# Patient Record
Sex: FEMALE | Race: WHITE | Employment: OTHER | URBAN - METROPOLITAN AREA
[De-identification: names, ages, dates, MRNs, and addresses within clinical notes are randomized per-mention and may not be internally consistent; named-entity substitution may affect disease eponyms.]

---

## 2017-07-24 PROBLEM — I51.7 BILATERAL ENLARGEMENT OF ATRIA: Status: ACTIVE | Noted: 2017-07-24

## 2017-07-24 PROBLEM — I34.0 NON-RHEUMATIC MITRAL REGURGITATION: Status: ACTIVE | Noted: 2017-07-24

## 2017-07-24 PROBLEM — R93.1 ABNORMAL ECHOCARDIOGRAM: Status: ACTIVE | Noted: 2017-07-24

## 2017-07-24 PROBLEM — I36.1 NON-RHEUMATIC TRICUSPID VALVE INSUFFICIENCY: Status: ACTIVE | Noted: 2017-07-24

## 2019-04-09 PROBLEM — R93.1 ABNORMAL ECHOCARDIOGRAM: Status: RESOLVED | Noted: 2017-07-24 | Resolved: 2019-04-09

## 2019-04-09 PROBLEM — I50.22 CHRONIC SYSTOLIC CHF (CONGESTIVE HEART FAILURE) (HCC): Status: ACTIVE | Noted: 2019-04-09

## 2020-01-28 PROBLEM — R60.0 BILATERAL LEG EDEMA: Status: ACTIVE | Noted: 2020-01-28

## 2020-04-10 PROBLEM — I10 ESSENTIAL HYPERTENSION: Status: ACTIVE | Noted: 2020-04-10

## 2020-04-10 PROBLEM — Z79.899 LONG TERM CURRENT USE OF ANTIARRHYTHMIC DRUG: Status: ACTIVE | Noted: 2020-04-10

## 2020-04-10 PROBLEM — I95.1 ORTHOSTATIC HYPOTENSION: Status: ACTIVE | Noted: 2020-04-10

## 2020-09-17 ENCOUNTER — HOSPITAL ENCOUNTER (OUTPATIENT)
Dept: PREADMISSION TESTING | Age: 85
Setting detail: SPECIMEN
Discharge: HOME | End: 2020-09-21
Payer: MEDICARE

## 2020-09-17 PROCEDURE — U0003 INFECTIOUS AGENT DETECTION BY NUCLEIC ACID (DNA OR RNA); SEVERE ACUTE RESPIRATORY SYNDROME CORONAVIRUS 2 (SARS-COV-2) (CORONAVIRUS DISEASE [COVID-19]), AMPLIFIED PROBE TECHNIQUE, MAKING USE OF HIGH THROUGHPUT TECHNOLOGIES AS DESCRIBED BY CMS-2020-01-R: HCPCS

## 2020-09-18 LAB — SARS-COV-2, NAA: NOT DETECTED

## 2020-09-19 ENCOUNTER — TELEPHONE (OUTPATIENT)
Dept: PRIMARY CARE CLINIC | Age: 85
End: 2020-09-19

## 2020-09-21 ENCOUNTER — ANESTHESIA EVENT (OUTPATIENT)
Dept: OPERATING ROOM | Age: 85
End: 2020-09-21
Payer: MEDICARE

## 2020-09-21 ENCOUNTER — HOSPITAL ENCOUNTER (OUTPATIENT)
Age: 85
Setting detail: OUTPATIENT SURGERY
Discharge: HOME | End: 2020-09-21
Attending: INTERNAL MEDICINE | Admitting: INTERNAL MEDICINE
Payer: MEDICARE

## 2020-09-21 ENCOUNTER — ANESTHESIA (OUTPATIENT)
Dept: OPERATING ROOM | Age: 85
End: 2020-09-21
Payer: MEDICARE

## 2020-09-21 VITALS
SYSTOLIC BLOOD PRESSURE: 121 MMHG | RESPIRATION RATE: 18 BRPM | WEIGHT: 100 LBS | BODY MASS INDEX: 19.63 KG/M2 | OXYGEN SATURATION: 99 % | TEMPERATURE: 97.9 F | HEART RATE: 69 BPM | HEIGHT: 60 IN | DIASTOLIC BLOOD PRESSURE: 49 MMHG

## 2020-09-21 VITALS — DIASTOLIC BLOOD PRESSURE: 59 MMHG | OXYGEN SATURATION: 100 % | SYSTOLIC BLOOD PRESSURE: 130 MMHG

## 2020-09-21 PROCEDURE — 7100000010 HC PHASE II RECOVERY - FIRST 15 MIN: Performed by: INTERNAL MEDICINE

## 2020-09-21 PROCEDURE — 3609012400 HC EGD TRANSORAL BIOPSY SINGLE/MULTIPLE: Performed by: INTERNAL MEDICINE

## 2020-09-21 PROCEDURE — 2580000003 HC RX 258: Performed by: ANESTHESIOLOGY

## 2020-09-21 PROCEDURE — 3700000001 HC ADD 15 MINUTES (ANESTHESIA): Performed by: INTERNAL MEDICINE

## 2020-09-21 PROCEDURE — 7100000011 HC PHASE II RECOVERY - ADDTL 15 MIN: Performed by: INTERNAL MEDICINE

## 2020-09-21 PROCEDURE — 6360000002 HC RX W HCPCS: Performed by: NURSE ANESTHETIST, CERTIFIED REGISTERED

## 2020-09-21 PROCEDURE — 88305 TISSUE EXAM BY PATHOLOGIST: CPT

## 2020-09-21 PROCEDURE — 2500000003 HC RX 250 WO HCPCS: Performed by: NURSE ANESTHETIST, CERTIFIED REGISTERED

## 2020-09-21 PROCEDURE — 2709999900 HC NON-CHARGEABLE SUPPLY: Performed by: INTERNAL MEDICINE

## 2020-09-21 PROCEDURE — 3700000000 HC ANESTHESIA ATTENDED CARE: Performed by: INTERNAL MEDICINE

## 2020-09-21 RX ORDER — PROPOFOL 10 MG/ML
INJECTION, EMULSION INTRAVENOUS PRN
Status: DISCONTINUED | OUTPATIENT
Start: 2020-09-21 | End: 2020-09-21 | Stop reason: SDUPTHER

## 2020-09-21 RX ORDER — LIDOCAINE HYDROCHLORIDE 10 MG/ML
1 INJECTION, SOLUTION EPIDURAL; INFILTRATION; INTRACAUDAL; PERINEURAL
Status: DISCONTINUED | OUTPATIENT
Start: 2020-09-21 | End: 2020-09-21 | Stop reason: HOSPADM

## 2020-09-21 RX ORDER — SODIUM CHLORIDE, SODIUM LACTATE, POTASSIUM CHLORIDE, CALCIUM CHLORIDE 600; 310; 30; 20 MG/100ML; MG/100ML; MG/100ML; MG/100ML
INJECTION, SOLUTION INTRAVENOUS CONTINUOUS
Status: DISCONTINUED | OUTPATIENT
Start: 2020-09-21 | End: 2020-09-21 | Stop reason: HOSPADM

## 2020-09-21 RX ORDER — SODIUM CHLORIDE 0.9 % (FLUSH) 0.9 %
10 SYRINGE (ML) INJECTION EVERY 12 HOURS SCHEDULED
Status: DISCONTINUED | OUTPATIENT
Start: 2020-09-21 | End: 2020-09-21 | Stop reason: HOSPADM

## 2020-09-21 RX ORDER — LIDOCAINE HYDROCHLORIDE 20 MG/ML
INJECTION, SOLUTION EPIDURAL; INFILTRATION; INTRACAUDAL; PERINEURAL PRN
Status: DISCONTINUED | OUTPATIENT
Start: 2020-09-21 | End: 2020-09-21 | Stop reason: SDUPTHER

## 2020-09-21 RX ORDER — SODIUM CHLORIDE 0.9 % (FLUSH) 0.9 %
10 SYRINGE (ML) INJECTION PRN
Status: DISCONTINUED | OUTPATIENT
Start: 2020-09-21 | End: 2020-09-21 | Stop reason: HOSPADM

## 2020-09-21 RX ORDER — SODIUM CHLORIDE 9 MG/ML
INJECTION, SOLUTION INTRAVENOUS CONTINUOUS
Status: DISCONTINUED | OUTPATIENT
Start: 2020-09-21 | End: 2020-09-21

## 2020-09-21 RX ADMIN — PROPOFOL 10 MG: 10 INJECTION, EMULSION INTRAVENOUS at 12:23

## 2020-09-21 RX ADMIN — PROPOFOL 40 MG: 10 INJECTION, EMULSION INTRAVENOUS at 12:19

## 2020-09-21 RX ADMIN — SODIUM CHLORIDE, POTASSIUM CHLORIDE, SODIUM LACTATE AND CALCIUM CHLORIDE: 600; 310; 30; 20 INJECTION, SOLUTION INTRAVENOUS at 10:28

## 2020-09-21 RX ADMIN — LIDOCAINE HYDROCHLORIDE 50 MG: 20 INJECTION, SOLUTION EPIDURAL; INFILTRATION; INTRACAUDAL; PERINEURAL at 12:18

## 2020-09-21 RX ADMIN — PROPOFOL 10 MG: 10 INJECTION, EMULSION INTRAVENOUS at 12:20

## 2020-09-21 ASSESSMENT — PULMONARY FUNCTION TESTS
PIF_VALUE: 1

## 2020-09-21 ASSESSMENT — PAIN SCALES - GENERAL
PAINLEVEL_OUTOF10: 0

## 2020-09-21 ASSESSMENT — PAIN - FUNCTIONAL ASSESSMENT: PAIN_FUNCTIONAL_ASSESSMENT: 0-10

## 2020-09-21 NOTE — ANESTHESIA POSTPROCEDURE EVALUATION
Department of Anesthesiology  Postprocedure Note    Patient: Michael Brothers  MRN: [id number]  YOB: 1935  Date of evaluation: 9/21/2020  Time:  12:46 PM     Procedure Summary     Date:  09/21/20 Room / Location:  [place] [address] / [facility] - INPATIENT    Anesthesia Start:  1215 Anesthesia Stop:  1234    Procedure:  EGD BIOPSY Diagnosis:  (DX ABD PAIN)    Surgeon:  Tree Jenkins MD Responsible Provider:  Sonam Feliz DO    Anesthesia Type:  MAC, general ASA Status:  3          Anesthesia Type: No value filed. Javi Phase I:      Javi Phase II: Javi Score: 8    Last vitals: Reviewed and per EMR flowsheets.        Anesthesia Post Evaluation    Patient location during evaluation: PACU  Patient participation: complete - patient participated  Level of consciousness: awake and alert  Airway patency: patent  Nausea & Vomiting: no nausea and no vomiting  Complications: no  Cardiovascular status: hemodynamically stable  Respiratory status: acceptable  Hydration status: stable

## 2020-09-21 NOTE — OP NOTE
Operative Note      Patient: Frakn Saleh  YOB: 1935  MRN: [id number]    Date of Procedure: 9/21/2020    Pre-Op Diagnosis: DX ABD PAIN    Indication for the procedure patient is a pleasant 82years old female who was seen with abdominal pain in the epigastric area and nausea. She is scheduled for EGD for further evaluation. Post-Op Diagnosis: Medium size hiatal hernia, gastritis and irregular Z line      Informed consent: Risks, benefits, alternatives of procedure were explained to the patient prior to the procedure. Risks include but not limited to bleeding, perforation, aspiration, allergic reaction to medication. Patient is agreeable to proceed. Procedure(s):  EGD BIOPSY    Surgeon(s): Chetan Pond MD    Assistant:   * No surgical staff found *    Anesthesia: * No anesthesia type entered *    Estimated Blood Loss (mL): Minimal    Complications: None    Specimens:   ID Type Source Tests Collected by Time Destination   A : BIOPSY DUODENUM TEST FOR CELIAC  Tissue Duodenum SURGICAL PATHOLOGY Chetan Pond MD 9/21/2020 1221    B : GASTRIC BIOPSY FOR H PYLORIS STAIN Tissue Stomach SURGICAL PATHOLOGY Chetan Pond MD 9/21/2020 1223    C : BIOPSY ESOPHGUS FOR BARRETS Tissue Stomach SURGICAL PATHOLOGY Chetan Pond MD 9/21/2020 1224    D : BIOPSY PROXIMAL ESOPHGAS FOR EOSINOPHILIC ESOPAGITIS Tissue Stomach SURGICAL PATHOLOGY Chetan Pond MD 9/21/2020 1225        Implants:  * No implants in log *      Drains: * No LDAs found *    Findings: 1 medium size hiatal hernia  2.-  Patchy erythema of the stomach. Biopsies were taken to rule out H. pylori  3. Irregular Z line biopsies were taken to rule out Castorena's esophagus    Detailed Description of Procedure:   Patient was placed in the left lateral decubitus position. The well-lubricated Olympus EGD scope was passed was a bite block was advanced into the esophagus. Esophageal mucosa of the upper middle esophagus appeared normal.  Biopsies were taken. The scope was then advanced into the distal esophagus. Irregular Z line was seen. Biopsies were taken to rule out Castorena's esophagus. The scope was then advanced into the stomach. Patchy erythema of the stomach was noted. Biopsies were taken to rule out H. pylori. Retroflexion was done. Hiatal hernia was seen. The scope was then brought back into forward-viewing's and was advanced into the duodenum. Biopsies were taken to rule out celiac. The scope was then removed outside the patient. Patient tolerated procedure well.     Electronically signed by Hamilton Hoyos MD on 9/21/2020 at 7:15 PM

## 2020-09-21 NOTE — BRIEF OP NOTE
Brief Postoperative Note      Patient: Paulino Stevens  YOB: 1935  MRN: [id number]    Date of Procedure: 9/21/2020    Pre-Op Diagnosis: DX ABD PAIN    Post-Op Diagnosis: Medium size hiatal hernia, gastritis, lower esophageal ring, irregular Z line    Procedure(s):  EGD ESOPHAGOGASTRODUODENOSCOPY    Surgeon(s):   Rubén Gamez MD    Assistant:  * No surgical staff found *    Anesthesia: Monitor Anesthesia Care    Estimated Blood Loss (mL): Minimal    Complications: None    Specimens:   ID Type Source Tests Collected by Time Destination   A : BIOPSY DUODENUM TEST FOR CELIAC  Tissue Duodenum SURGICAL PATHOLOGY Rubén Gamez MD 9/21/2020 1221    B : GASTRIC BIOPSY FOR H PYLORIS STAIN Tissue Stomach SURGICAL PATHOLOGY Rubén Gamez MD 9/21/2020 1223    C : BIOPSY ESOPHGUS FOR BARRETS Tissue Stomach SURGICAL PATHOLOGY Rubén Gamez MD 9/21/2020 1224    D : BIOPSY PROXIMAL ESOPHGAS FOR EOSINOPHILIC ESOPAGITIS Tissue Stomach SURGICAL PATHOLOGY Rubén Gamez MD 9/21/2020 1225        Implants:  * No implants in log *      Drains: * No LDAs found *    Findings: As above    Electronically signed by Jackie Green MD on 9/21/2020 at 12:28 PM

## 2020-09-21 NOTE — H&P
History and Physical Service   [place] [address]    HISTORY AND PHYSICAL EXAMINATION            Date of Evaluation: 9/21/2020  Patient name:  Daria Conroy  MRN:   [id number]  YOB: 1935  PCP:    Siva Shahid MD    History Obtained From:     Patient, medical records     History of Present Illness: This is Daria Conroy a 82 y.o. female who presents today for a EGD by Dr. Maribel Mccallum for Abdominal Pain. Previous colonoscopy last december. Patient c/o severe pain around epigastric diaphragm area after eating, states has been over 6 months . Today denies bloody tarry stools, diarrhea alternating with constipation, fever, chills, night sweats, pain . Yes unexplained weight loss around 10 pounds. No history of ulcers, hiatal hernia . States has acid reflux/GERD, chronic constipation and  IBS. Has a defibrillator present due to history of atrial fibrillation. Denies any blood thinner use, denies diabetes history. Last ate and drank 9pm yesterday. Past Medical History:     Past Medical History:   Diagnosis Date    Atrial fibrillation ([address]     Cancer (HCC)     non hodgkins lymphoma    Cardiomyopathy ([address]     Cataract     CHF (congestive heart failure) (HCC)     COPD (chronic obstructive pulmonary disease) (HCC)     Dyslipidemia     Emphysema of lung (HCC)     Esophageal reflux     Fibromyalgia     Lupus ([address]     Macular degeneration     Osteoarthritis     Osteopenia     Osteopenia     Systemic lupus erythematosus ([address]     Trigeminal neuralgia         Past Surgical History:     Past Surgical History:   Procedure Laterality Date    CARDIAC DEFIBRILLATOR PLACEMENT  11/12, 4/3/2018    [employer]    CHOLECYSTECTOMY      COLONOSCOPY      EYE SURGERY      cataracts    [facility]    SHOULDER ARTHROPLASTY  2013        Medications Prior to Admission:     Prior to Admission medications    Medication Sig Start Date End Date Taking? Authorizing Provider   furosemide (LASIX) 40 MG tablet 2 tablets in qam and 1 tablet afternoon 7/2/20  Yes Luciano Rivera MD   propafenone (RYTHMOL) 150 MG tablet TAKE 1 TABLET THREE TIMES A DAY 5/29/20  Yes Sherita Marquez MD   mirtazapine (REMERON) 15 MG tablet Take 15 mg by mouth nightly   Yes Historical Provider, MD   Probiotic Product (PROBIOTIC DAILY PO) Take by mouth   Yes Historical Provider, MD   folic acid (FOLVITE) 1 MG tablet Take 1 mg by mouth daily. Yes Historical Provider, MD   doxepin (SINEQUAN) 50 MG capsule Take 50 mg by mouth nightly. Yes Historical Provider, MD   Lutein 6 MG CAPS Take by mouth    Historical Provider, MD   Calcium Carbonate (CALCIUM 600 PO) Take  by mouth daily. Historical Provider, MD   Omega-3 Fatty Acids (OMEGA 3 PO) Take  by mouth daily. Historical Provider, MD   Ascorbic Acid (VITAMIN C) 100 MG tablet Take 100 mg by mouth daily. Historical Provider, MD        Allergies:     Amiodarone; Apresoline [hydralazine]; Cephalosporins; Feldene [piroxicam]; Fosamax [alendronate]; Hydroxychloroquine; Infliximab; Levofloxacin; Loop diuretics; Niacin and related; Noroxin [norfloxacin]; Pcn [penicillins]; Penicillin v potassium; Piroxicam; Plaquenil [hydroxychloroquine sulfate]; Prilosec otc [omeprazole magnesium]; Procainamide; Procainamide hcl; Seasonal; Seldane [terfenadine]; Sulfa antibiotics; Tramadol; and Omeprazole    Social History:     Tobacco:    reports that she has never smoked. She has never used smokeless tobacco.  Alcohol:      reports no history of alcohol use. Drug Use:  reports no history of drug use. Family History:     Family History   Problem Relation Age of Onset    Heart Disease Father     Cancer Maternal Grandmother         uterine       Review of Systems:     Positive and Negative as described in HPI.     CONSTITUTIONAL:  negative for fevers, chills, sweats, fatigue, weight loss  HEENT:  negative for vision, hearing changes, runny nose, throat pain  RESPIRATORY:  negative for shortness of breath, cough, congestion, wheezing. CARDIOVASCULAR:  negative for chest pain, palpitations. GASTROINTESTINAL:  negative for nausea, vomiting, diarrhea, constipation, change in bowel habits, abdominal pain   GENITOURINARY:  negative for difficulty of urination, burning with urination, frequency   INTEGUMENT:  negative for rash, skin lesions, easy bruising   HEMATOLOGIC/LYMPHATIC:  negative for swelling/edema   ALLERGIC/IMMUNOLOGIC:  negative for urticaria , itching  ENDOCRINE:  negative increase in drinking, increase in urination, hot or cold intolerance  MUSCULOSKELETAL:  negative joint pains, muscle aches, swelling of joints  NEUROLOGICAL:  negative for headaches, dizziness, lightheadedness, numbness, pain, tingling extremities  BEHAVIOR/PSYCH:  negative for depression, anxiety    Physical Exam:   BP (!) 139/59   Pulse 73   Temp 95.7 °F (35.4 °C) (Temporal)   Resp 20   Ht 5' (1.524 m)   Wt 100 lb (45.4 kg)   SpO2 99%   BMI 19.53 kg/m²   No LMP recorded. Patient is postmenopausal.  No obstetric history on file. No results for input(s): POCGLU in the last 72 hours. General Appearance:  alert, well appearing, and in no acute distress  Mental status: oriented to person, place, and time with normal affect  Head:  normocephalic, atraumatic. Eye: no icterus, redness, pupils equal and reactive, extraocular eye movements intact, conjunctiva clear  Ear: normal external ear, no discharge, hearing intact  Nose:  no drainage noted  Mouth: mucous membranes moist  Neck: supple, no carotid bruits, thyroid not palpable  Lungs: Bilateral equal air entry, clear to ausculation, no wheezing, rales or rhonchi, normal effort  Cardiovascular: normal rate, regular rhythm, no murmur, gallop, rub.   Abdomen: Soft, nontender, nondistended, normal bowel sounds, no hepatomegaly or splenomegaly  Neurologic: There are no new focal motor or sensory deficits, normal muscle tone and bulk, no abnormal sensation, normal speech, cranial nerves II through XII grossly intact  Skin: No gross lesions, rashes, bruising or bleeding on exposed skin area  Extremities:  peripheral pulses palpable, no pedal edema or calf pain with palpation  Psych: normal affect     Investigations:      Laboratory Testing:  No results found for this or any previous visit (from the past 24 hour(s)). No results for input(s): HGB, HCT, WBC, MCV, PLATELET, NA, K, CL, CO2, BUN, CREATININE, GLUCOSE, INR, PROTIME, APTT, AST, ALT, LABALBU, HCG in the last 720 hours. Recent Labs     09/17/20  1030   COVID19 Not Detected     Imaging/Diagnostics:        Diagnosis:      1. Epigastric Abdominal Pain    Plans:     1. EGD       Refugio Osgood, APRN - CNP  9/21/2020  10:19 AM     Patient was seen and examined. Agree with above.  Will proceed with EGD

## 2020-09-21 NOTE — ANESTHESIA PRE PROCEDURE
Department of Anesthesiology  Preprocedure Note       Name:  Larisa Joseph   Age:  82 y.o.  :  1935                                          MRN:  [id number]         Date:  2020      Surgeon: Kelsey Chris): Jw Mendieta MD    Procedure: Procedure(s):  EGD BIOPSY    Medications prior to admission:   Prior to Admission medications    Medication Sig Start Date End Date Taking? Authorizing Provider   furosemide (LASIX) 40 MG tablet 2 tablets in qam and 1 tablet afternoon 20  Yes Spencer Craig MD   propafenone (RYTHMOL) 150 MG tablet TAKE 1 TABLET THREE TIMES A DAY 20  Yes Debbi Johnson MD   mirtazapine (REMERON) 15 MG tablet Take 15 mg by mouth nightly   Yes Historical Provider, MD   Probiotic Product (PROBIOTIC DAILY PO) Take by mouth   Yes Historical Provider, MD   folic acid (FOLVITE) 1 MG tablet Take 1 mg by mouth daily. Yes Historical Provider, MD   doxepin (SINEQUAN) 50 MG capsule Take 50 mg by mouth nightly. Yes Historical Provider, MD   Lutein 6 MG CAPS Take by mouth    Historical Provider, MD   Calcium Carbonate (CALCIUM 600 PO) Take  by mouth daily. Historical Provider, MD   Omega-3 Fatty Acids (OMEGA 3 PO) Take  by mouth daily. Historical Provider, MD   Ascorbic Acid (VITAMIN C) 100 MG tablet Take 100 mg by mouth daily. Historical Provider, MD       Current medications:    Current Facility-Administered Medications   Medication Dose Route Frequency Provider Last Rate Last Dose    lactated ringers infusion   Intravenous Continuous Evelyn Newton  mL/hr at 20 1028      sodium chloride flush 0.9 % injection 10 mL  10 mL Intravenous 2 times per day Evelyn Newton MD        sodium chloride flush 0.9 % injection 10 mL  10 mL Intravenous PRN Evelyn Newton MD        lidocaine PF 1 % injection 1 mL  1 mL Intradermal Once PRN Evelyn Newton MD           Allergies:     Allergies   Allergen Reactions    Amiodarone Other (See Comments)    Apresoline [Hydralazine]     Cephalosporins      Other reaction(s): Unknown  Tolerated cephalexin on 7/2/2014.  Feldene [Piroxicam]     Fosamax [Alendronate] Other (See Comments)     Jaw paino    Hydroxychloroquine     Infliximab     Levofloxacin     Loop Diuretics     Niacin And Related     Noroxin [Norfloxacin]      Other reaction(s): Unknown    Pcn [Penicillins]     Penicillin V Potassium Other (See Comments)     In childhood, experienced swelling. Tolerated cephalexin on 7/2/2014. Currently taking cephalexin without issue.      Piroxicam     Plaquenil [Hydroxychloroquine Sulfate]     Prilosec Otc [Omeprazole Magnesium]     Procainamide Other (See Comments)    Procainamide Hcl      PROCAINAMIDE HCL powder    Seasonal Hives     Other reaction(s): Unknown  LoopHzcal, Sifmapu    Seldane [Terfenadine]     Sulfa Antibiotics      Other reaction(s): Dizziness    Tramadol Other (See Comments)     Dizziness    Omeprazole Other (See Comments) and Nausea And Vomiting       Problem List:    Patient Active Problem List   Diagnosis Code    Nonischemic cardiomyopathy [address] I42.8    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    Fibrositis M79.7    Muscle ache M79.10    Osteomyelitis ([facility]) M86.9    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    Abnormal presence of protein in urine R80.9    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    Fibromyalgia M79.7    AICD (automatic cardioverter/defibrillator) present Z95.810    Paroxysmal atrial fibrillation (HCC) I48.0    Bilateral enlargement of atria I51.7    Non-rheumatic tricuspid valve insufficiency I36.1    Non-rheumatic mitral regurgitation I34.0    Chronic systolic congestive heart failure (HCC) I50.22    Bilateral leg edema R60.0    Long term current use of antiarrhythmic drug Z79.899    Orthostatic hypotension I95.1    Essential hypertension I10       Past Medical History:        Diagnosis Date    Atrial fibrillation (HCC)     Cancer (HCC)     non hodgkins lymphoma    [facility]     Cataract     CHF (congestive heart failure) (HCC)     COPD (chronic obstructive pulmonary disease) (HCC)     Dyslipidemia     Emphysema of lung (HCC)     Esophageal reflux     Fibromyalgia     Lupus ([facility]     Macular degeneration     Osteoarthritis     Osteopenia     Osteopenia     Systemic lupus erythematosus ([facility]     Trigeminal neuralgia        Past Surgical History:        Procedure Laterality Date    CARDIAC DEFIBRILLATOR PLACEMENT  11/12, 4/3/2018    [employer]    CHOLECYSTECTOMY      COLONOSCOPY      EYE SURGERY      cataracts    FIX[facility]  08/2020    [facility]    SHOULDER ARTHROPLASTY  2013       Social History:    Social History     Tobacco Use    Smoking status: Never Smoker    Smokeless tobacco: Never Used   Substance Use Topics    Alcohol use: No                                Counseling given: Not Answered      Vital Signs (Current):   Vitals:    09/21/20 1006 09/21/20 1011 09/21/20 1232   BP: (!) 139/59  (!) 112/42   Pulse: 73  69   Resp: 20  17   Temp: 95.7 °F (35.4 °C)  97.9 °F (36.6 °C)   TempSrc: Temporal  Temporal   SpO2: 99%  100%   Weight:  100 lb (45.4 kg)    Height:  5' (1.524 m)                                               BP Readings from Last 3 Encounters:   09/21/20 (!) 112/42   09/21/20 (!) 130/59   06/22/20 124/68       NPO Status: Time of last liquid consumption: 2100                        Time of last solid consumption: 2000                        Date of last liquid consumption: 09/20/20                        Date of last solid food consumption: 09/20/20    BMI:   Wt Readings from Last 3 Encounters:   09/21/20 100 lb (45.4 kg)   06/22/20 100 lb (45.4 kg)   06/22/20 100 lb (45.4 kg)     Body mass index is 19.53 kg/m².     CBC: No results found for: WBC, RBC, HGB, HCT, MCV, RDW, PLT    CMP:   Lab Results   Component Value Date     03/17/2020    K 3.8 03/17/2020    CL 95 03/17/2020    CO2 35 03/17/2020 BUN 29 03/17/2020    CREATININE 0.79 03/17/2020    GLUCOSE 89 03/17/2020    CALCIUM 9.5 03/17/2020       POC Tests: No results for input(s): POCGLU, POCNA, POCK, POCCL, POCBUN, POCHEMO, POCHCT in the last 72 hours. Coags:   Lab Results   Component Value Date    PROTIME 10.5 12/10/2012    INR 0.9 12/10/2012       HCG (If Applicable): No results found for: PREGTESTUR, PREGSERUM, HCG, HCGQUANT     ABGs: No results found for: PHART, PO2ART, NPC8MLN, LMO4HFW, BEART, A1IHHBPC     Type & Screen (If Applicable):  No results found for: LABABO, LABRH    Drug/Infectious Status (If Applicable):  No results found for: HIV, HEPCAB    COVID-19 Screening (If Applicable):   Lab Results   Component Value Date    COVID19 Not Detected 09/17/2020         Anesthesia Evaluation  Patient summary reviewed and Nursing notes reviewed no history of anesthetic complications:   Airway: Mallampati: II  TM distance: >3 FB   Neck ROM: full  Mouth opening: > = 3 FB Dental: normal exam         Pulmonary:normal exam    (+) COPD:                             Cardiovascular:  Exercise tolerance: no interval change,   (+) hypertension:, CHF:,     (-) past MI and CAD        Rate: normal                    Neuro/Psych:      (-) TIA and CVA           GI/Hepatic/Renal:   (+) GERD:,           Endo/Other:    (+) : arthritis:., .                 Abdominal:           Vascular:                                        Anesthesia Plan      MAC and general     ASA 3       Induction: intravenous. Anesthetic plan and risks discussed with patient. Plan discussed with CRNA.     Attending anesthesiologist reviewed and agrees with Pre Eval content              Tariq Yusuf DO   9/21/2020

## 2020-09-22 LAB — SURGICAL PATHOLOGY REPORT: NORMAL

## 2020-12-14 ENCOUNTER — HOSPITAL ENCOUNTER (OUTPATIENT)
Dept: LAB | Age: 85
Setting detail: SPECIMEN
Discharge: HOME | End: 2020-12-14
Payer: MEDICARE

## 2020-12-14 PROCEDURE — U0003 INFECTIOUS AGENT DETECTION BY NUCLEIC ACID (DNA OR RNA); SEVERE ACUTE RESPIRATORY SYNDROME CORONAVIRUS 2 (SARS-COV-2) (CORONAVIRUS DISEASE [COVID-19]), AMPLIFIED PROBE TECHNIQUE, MAKING USE OF HIGH THROUGHPUT TECHNOLOGIES AS DESCRIBED BY CMS-2020-01-R: HCPCS

## 2020-12-15 LAB
SARS-COV-2, RAPID: NORMAL
SARS-COV-2: NORMAL
SARS-COV-2: NOT DETECTED
SOURCE: NORMAL

## 2020-12-16 ENCOUNTER — TELEPHONE (OUTPATIENT)
Dept: FAMILY MEDICINE CLINIC | Age: 85
End: 2020-12-16

## 2020-12-17 ENCOUNTER — ANESTHESIA EVENT (OUTPATIENT)
Dept: OPERATING ROOM | Age: 85
End: 2020-12-17
Payer: MEDICARE

## 2020-12-18 ENCOUNTER — HOSPITAL ENCOUNTER (OUTPATIENT)
Age: 85
Setting detail: OUTPATIENT SURGERY
Discharge: HOME | End: 2020-12-18
Attending: INTERNAL MEDICINE | Admitting: INTERNAL MEDICINE
Payer: MEDICARE

## 2020-12-18 ENCOUNTER — ANESTHESIA (OUTPATIENT)
Dept: OPERATING ROOM | Age: 85
End: 2020-12-18
Payer: MEDICARE

## 2020-12-18 VITALS
OXYGEN SATURATION: 98 % | HEART RATE: 72 BPM | TEMPERATURE: 98.3 F | RESPIRATION RATE: 14 BRPM | DIASTOLIC BLOOD PRESSURE: 63 MMHG | BODY MASS INDEX: 20.22 KG/M2 | WEIGHT: 103 LBS | HEIGHT: 60 IN | SYSTOLIC BLOOD PRESSURE: 134 MMHG

## 2020-12-18 VITALS
DIASTOLIC BLOOD PRESSURE: 61 MMHG | RESPIRATION RATE: 16 BRPM | SYSTOLIC BLOOD PRESSURE: 134 MMHG | OXYGEN SATURATION: 93 %

## 2020-12-18 PROCEDURE — 2500000003 HC RX 250 WO HCPCS: Performed by: SPECIALIST

## 2020-12-18 PROCEDURE — 3609010600 HC COLONOSCOPY POLYPECTOMY SNARE/COLD BIOPSY: Performed by: INTERNAL MEDICINE

## 2020-12-18 PROCEDURE — 88305 TISSUE EXAM BY PATHOLOGIST: CPT

## 2020-12-18 PROCEDURE — 3700000001 HC ADD 15 MINUTES (ANESTHESIA): Performed by: INTERNAL MEDICINE

## 2020-12-18 PROCEDURE — 2709999900 HC NON-CHARGEABLE SUPPLY: Performed by: INTERNAL MEDICINE

## 2020-12-18 PROCEDURE — 6360000002 HC RX W HCPCS: Performed by: SPECIALIST

## 2020-12-18 PROCEDURE — 7100000011 HC PHASE II RECOVERY - ADDTL 15 MIN: Performed by: INTERNAL MEDICINE

## 2020-12-18 PROCEDURE — 2580000003 HC RX 258: Performed by: ANESTHESIOLOGY

## 2020-12-18 PROCEDURE — 7100000010 HC PHASE II RECOVERY - FIRST 15 MIN: Performed by: INTERNAL MEDICINE

## 2020-12-18 PROCEDURE — 3700000000 HC ANESTHESIA ATTENDED CARE: Performed by: INTERNAL MEDICINE

## 2020-12-18 RX ORDER — SODIUM CHLORIDE 0.9 % (FLUSH) 0.9 %
10 SYRINGE (ML) INJECTION EVERY 12 HOURS SCHEDULED
Status: DISCONTINUED | OUTPATIENT
Start: 2020-12-18 | End: 2020-12-18 | Stop reason: HOSPADM

## 2020-12-18 RX ORDER — LIDOCAINE HYDROCHLORIDE 10 MG/ML
1 INJECTION, SOLUTION EPIDURAL; INFILTRATION; INTRACAUDAL; PERINEURAL
Status: DISCONTINUED | OUTPATIENT
Start: 2020-12-18 | End: 2020-12-18 | Stop reason: HOSPADM

## 2020-12-18 RX ORDER — SODIUM CHLORIDE, SODIUM LACTATE, POTASSIUM CHLORIDE, CALCIUM CHLORIDE 600; 310; 30; 20 MG/100ML; MG/100ML; MG/100ML; MG/100ML
INJECTION, SOLUTION INTRAVENOUS CONTINUOUS
Status: DISCONTINUED | OUTPATIENT
Start: 2020-12-19 | End: 2020-12-18 | Stop reason: HOSPADM

## 2020-12-18 RX ORDER — LIDOCAINE HYDROCHLORIDE 10 MG/ML
INJECTION, SOLUTION EPIDURAL; INFILTRATION; INTRACAUDAL; PERINEURAL PRN
Status: DISCONTINUED | OUTPATIENT
Start: 2020-12-18 | End: 2020-12-18 | Stop reason: SDUPTHER

## 2020-12-18 RX ORDER — ONDANSETRON 2 MG/ML
4 INJECTION INTRAMUSCULAR; INTRAVENOUS
Status: DISCONTINUED | OUTPATIENT
Start: 2020-12-18 | End: 2020-12-18 | Stop reason: HOSPADM

## 2020-12-18 RX ORDER — SODIUM CHLORIDE 9 MG/ML
INJECTION, SOLUTION INTRAVENOUS CONTINUOUS
Status: DISCONTINUED | OUTPATIENT
Start: 2020-12-19 | End: 2020-12-18 | Stop reason: HOSPADM

## 2020-12-18 RX ORDER — SODIUM CHLORIDE 0.9 % (FLUSH) 0.9 %
10 SYRINGE (ML) INJECTION PRN
Status: DISCONTINUED | OUTPATIENT
Start: 2020-12-18 | End: 2020-12-18 | Stop reason: HOSPADM

## 2020-12-18 RX ORDER — PROPOFOL 10 MG/ML
INJECTION, EMULSION INTRAVENOUS PRN
Status: DISCONTINUED | OUTPATIENT
Start: 2020-12-18 | End: 2020-12-18 | Stop reason: SDUPTHER

## 2020-12-18 RX ADMIN — PROPOFOL 10 MG: 10 INJECTION, EMULSION INTRAVENOUS at 15:35

## 2020-12-18 RX ADMIN — LIDOCAINE HYDROCHLORIDE 30 MG: 10 INJECTION, SOLUTION EPIDURAL; INFILTRATION; INTRACAUDAL; PERINEURAL at 15:29

## 2020-12-18 RX ADMIN — SODIUM CHLORIDE, POTASSIUM CHLORIDE, SODIUM LACTATE AND CALCIUM CHLORIDE: 600; 310; 30; 20 INJECTION, SOLUTION INTRAVENOUS at 15:25

## 2020-12-18 RX ADMIN — PROPOFOL 10 MG: 10 INJECTION, EMULSION INTRAVENOUS at 15:49

## 2020-12-18 RX ADMIN — PROPOFOL 10 MG: 10 INJECTION, EMULSION INTRAVENOUS at 15:32

## 2020-12-18 RX ADMIN — PROPOFOL 10 MG: 10 INJECTION, EMULSION INTRAVENOUS at 15:42

## 2020-12-18 RX ADMIN — PROPOFOL 10 MG: 10 INJECTION, EMULSION INTRAVENOUS at 15:53

## 2020-12-18 RX ADMIN — PROPOFOL 30 MG: 10 INJECTION, EMULSION INTRAVENOUS at 15:29

## 2020-12-18 RX ADMIN — PHENYLEPHRINE HYDROCHLORIDE 75 MCG: 10 INJECTION INTRAVENOUS at 15:56

## 2020-12-18 RX ADMIN — SODIUM CHLORIDE, POTASSIUM CHLORIDE, SODIUM LACTATE AND CALCIUM CHLORIDE: 600; 310; 30; 20 INJECTION, SOLUTION INTRAVENOUS at 14:25

## 2020-12-18 ASSESSMENT — PULMONARY FUNCTION TESTS
PIF_VALUE: 0
PIF_VALUE: 1
PIF_VALUE: 1
PIF_VALUE: 0
PIF_VALUE: 1
PIF_VALUE: 0
PIF_VALUE: 1
PIF_VALUE: 0
PIF_VALUE: 0
PIF_VALUE: 1
PIF_VALUE: 0
PIF_VALUE: 1
PIF_VALUE: 0
PIF_VALUE: 1
PIF_VALUE: 0
PIF_VALUE: 1
PIF_VALUE: 0
PIF_VALUE: 1
PIF_VALUE: 0
PIF_VALUE: 1
PIF_VALUE: 0

## 2020-12-18 ASSESSMENT — PAIN SCALES - GENERAL
PAINLEVEL_OUTOF10: 0

## 2020-12-18 ASSESSMENT — PAIN - FUNCTIONAL ASSESSMENT: PAIN_FUNCTIONAL_ASSESSMENT: 0-10

## 2020-12-18 NOTE — H&P
History and Physical GI Update    Pt Name: Miah Velazquez  MRN: [id number]  YOB: 1935  Date of evaluation: 12/18/2020      [x] I have reviewed the [place]  Office Note sent by hard copy and placed on the short chart  dated 11/30/2020  by Dr. Ze Gomez  which meets the criteria for an Interval History and Physical note and is attached below. [x] I have examined  Cleo Mayorga and there are no changes to the patient or plans for a DIAGNOSTIC COLONOSCOPY . by Dr Ze Gomez  for EVALUATION OF ANEMIA. Sagar Meléndez Bowel Prep completed:X DOUBLE  Yes with YELLOW /BROWN  results. Last colonoscopy December OF 2019 . Biopsies were taken. The patient denies BLEEDING . Patient today denies  fever, chills, night sweats, pain or unexplained weight loss. SHE DOES NOT TAKE BLOOD THINNERS. SHE DOES NOT HAVE DIABETES. Vital signs: /62   Pulse 69   Temp 96.7 °F (35.9 °C) (Temporal)   Resp 20   SpO2 100%     Allergies:  Amiodarone, Apresoline [hydralazine], Cephalosporins, Feldene [piroxicam], Fosamax [alendronate], Hydroxychloroquine, Infliximab, Levofloxacin, Loop diuretics, Niacin and related, Noroxin [norfloxacin], Pcn [penicillins], Penicillin v potassium, Piroxicam, Plaquenil [hydroxychloroquine sulfate], Prilosec otc [omeprazole magnesium], Procainamide, Procainamide hcl, Seasonal, Seldane [terfenadine], Sulfa antibiotics, Tramadol, and Omeprazole    Medications:   No current facility-administered medications on file prior to encounter.       Current Outpatient Medications on File Prior to Encounter   Medication Sig Dispense Refill    furosemide (LASIX) 40 MG tablet 2 tablets in qam and 1 tablet afternoon 90 tablet 6    propafenone (RYTHMOL) 150 MG tablet TAKE 1 TABLET THREE TIMES A  tablet 3    mirtazapine (REMERON) 15 MG tablet Take 15 mg by mouth nightly      Probiotic Product (PROBIOTIC DAILY PO) Take by mouth      Lutein 6 MG CAPS Take by mouth  folic acid (FOLVITE) 1 MG tablet Take 1 mg by mouth daily.  Calcium Carbonate (CALCIUM 600 PO) Take  by mouth daily.  doxepin (SINEQUAN) 50 MG capsule Take 50 mg by mouth nightly.  Omega-3 Fatty Acids (OMEGA 3 PO) Take  by mouth daily.  Ascorbic Acid (VITAMIN C) 100 MG tablet Take 100 mg by mouth daily. Prior to Admission medications    Medication Sig Start Date End Date Taking? Authorizing Provider   furosemide (LASIX) 40 MG tablet 2 tablets in qam and 1 tablet afternoon 7/2/20   Stephanie Sandhu MD   propafenone (RYTHMOL) 150 MG tablet TAKE 1 TABLET THREE TIMES A DAY 5/29/20   Abbey Newman MD   mirtazapine (REMERON) 15 MG tablet Take 15 mg by mouth nightly    Historical Provider, MD   Probiotic Product (PROBIOTIC DAILY PO) Take by mouth    Historical Provider, MD   Lutein 6 MG CAPS Take by mouth    Historical Provider, MD   folic acid (FOLVITE) 1 MG tablet Take 1 mg by mouth daily. Historical Provider, MD   Calcium Carbonate (CALCIUM 600 PO) Take  by mouth daily. Historical Provider, MD   doxepin (SINEQUAN) 50 MG capsule Take 50 mg by mouth nightly. Historical Provider, MD   Omega-3 Fatty Acids (OMEGA 3 PO) Take  by mouth daily. Historical Provider, MD   Ascorbic Acid (VITAMIN C) 100 MG tablet Take 100 mg by mouth daily. Historical Provider, MD       This is a 82 y.o. female who is  pleasant, cooperative, alert and oriented x3, in no acute distress. Heart: Heart regular rate and rhythm   Lungs:clear to auscultation without wheezes or rales    Abdomen: soft, nontender, distended, .  bowel sounds present . ACTIVE    PEDAL PULSES + 2 BILATERALLY NO CALF TENDERNESS NO EDEMA    Labs:  No results for input(s): HGB, HCT, WBC, MCV, PLATELET, NA, K, CL, CO2, BUN, CREATININE, GLUCOSE, INR, PROTIME, APTT, AST, ALT, LABALBU, HCG in the last 720 hours.     PATRICK Marcano, ACNP-BC  Electronically signed 12/18/2020 at 2:14 PM

## 2020-12-18 NOTE — OP NOTE
Operative Note      Patient: Damián Jeff  YOB: 1935  MRN: [id number]    Date of Procedure: 12/18/2020    Pre-Op Diagnosis: Abnormal CT scan    Indication for the procedure: Patient is a pleasant 82years old female patient who is seen with abnormal CT scan showed possible mass in the ascending colon. Post-Op Diagnosis: Internal hemorrhoids, sigmoid diverticulosis and colon polyps. No mass was seen in the ascending colon. Procedure(s):  COLONOSCOPY POLYPECTOMY SNARE/COLD BIOPSY    Surgeon(s): Tuyet Chapin MD    Assistant:   * No surgical staff found *    Informed consent: Risks, benefits, and alternatives of the procedure were explained to the patient prior to the procedure. Risks include but not limited to bleeding, perforation, aspiration, allergic reaction to medication or death. Patient was also informed about the risk of missing a lesion.        Anesthesia: Monitor Anesthesia Care    Estimated Blood Loss (mL): Minimal    Complications: None    Specimens:   ID Type Source Tests Collected by Time Destination   A : TRANVERSE COLON POLPY  Tissue Colon-Transverse SURGICAL PATHOLOGY Tuyet Chapin MD 12/18/2020 1549    B : DESCENDING COLON POLYP Tissue Colon-Descending SURGICAL PATHOLOGY Tuyet Chapin MD 12/18/2020 1551    C : RECTAL POLYP  Tissue Rectum SURGICAL PATHOLOGY Tuyet Chapin MD 12/18/2020 1559        Implants:  * No implants in log *      Drains: * No LDAs found *    Findings: 1-internal hemorrhoids  2-sigmoid diverticulosis  3-4 mm polyp in the transverse colon that was removed by biopsy  4-8 mm polyp in the descending colon that was removed by biopsy  5-2 rectal polyps that measured 3 to 4 mm were removed with biopsy    Detailed Description of Procedure: Patient was placed in the left lateral decubitus position. Initially digital rectal exam was performed. No mass was appreciated. The well-lubricated Olympus colonoscope was passed through the rectum into the cecum. Cecum was identified by appendiceal orifice and ileocecal valve. Terminal ileum was intubated and appeared normal.  Cecal mucosa as well as ascending colon mucosa appeared normal.  Transverse colon mucosa showed 4 mm sessile polyp that was removed by biopsy. There was an 8 mm polyp in the descending colon that was removed biopsy. The scope was then directed to the sigmoid colon. There was sigmoid diverticula. There was multiple small and large diverticula. The scope was then brought back into the rectum. In the rectum there was 2 sessile polyps that appeared hyperplastic and they measured 3 to 4 mm removed with biopsy. Retroflexion was done. Internal hemorrhoids were seen. The scope was then withdrawn outside the patient. Plan: 1.  Follow up on results of pathology  2-follow-up in GI clinic in 1 to 2 weeks    Electronically signed by Nestor Cochran MD on 12/18/2020 at 4:03 PM

## 2020-12-18 NOTE — ANESTHESIA PRE PROCEDURE
Department of Anesthesiology  Preprocedure Note       Name:  Jacque Tate   Age:  82 y.o.  :  1935                                          MRN:  [id number]         Date:  2020      Surgeon: Juliet Phelan): Grace Langford MD    Procedure: Procedure(s):  COLONOSCOPY DIAGNOSTIC    Medications prior to admission:   Prior to Admission medications    Medication Sig Start Date End Date Taking? Authorizing Provider   furosemide (LASIX) 40 MG tablet 2 tablets in qam and 1 tablet afternoon 20  Yes Darryle Hacking, MD   propafenone (RYTHMOL) 150 MG tablet TAKE 1 TABLET THREE TIMES A DAY 20  Yes Marti Sheth MD   mirtazapine (REMERON) 15 MG tablet Take 15 mg by mouth nightly   Yes Historical Provider, MD   Probiotic Product (PROBIOTIC DAILY PO) Take by mouth   Yes Historical Provider, MD   Lutein 6 MG CAPS Take by mouth   Yes Historical Provider, MD   folic acid (FOLVITE) 1 MG tablet Take 1 mg by mouth daily. Yes Historical Provider, MD   Calcium Carbonate (CALCIUM 600 PO) Take  by mouth daily. Yes Historical Provider, MD   doxepin (SINEQUAN) 50 MG capsule Take 50 mg by mouth nightly. Yes Historical Provider, MD   Omega-3 Fatty Acids (OMEGA 3 PO) Take  by mouth daily. Historical Provider, MD   Ascorbic Acid (VITAMIN C) 100 MG tablet Take 100 mg by mouth daily.     Historical Provider, MD       Current medications:    Current Facility-Administered Medications   Medication Dose Route Frequency Provider Last Rate Last Admin    [START ON 2020] 0.9 % sodium chloride infusion   Intravenous Continuous Prieto Clark MD        [START ON 2020] lactated ringers infusion   Intravenous Continuous Prieto Clark  mL/hr at 20 1425 New Bag at 20 1425    sodium chloride flush 0.9 % injection 10 mL  10 mL Intravenous 2 times per day Montrell Stone MD        sodium chloride flush 0.9 % injection 10 mL  10 mL Intravenous PRN Montrell Stone MD  lidocaine PF 1 % injection 1 mL  1 mL Intradermal Once PRN Roman George MD           Allergies: Allergies   Allergen Reactions    Amiodarone Other (See Comments)    Apresoline [Hydralazine]     Cephalosporins      Other reaction(s): Unknown  Tolerated cephalexin on 7/2/2014.  Feldene [Piroxicam]     Fosamax [Alendronate] Other (See Comments)     Jaw paino    Hydroxychloroquine     Infliximab     Levofloxacin     Loop Diuretics     Niacin And Related     Noroxin [Norfloxacin]      Other reaction(s): Unknown    Pcn [Penicillins]     Penicillin V Potassium Other (See Comments)     In childhood, experienced swelling. Tolerated cephalexin on 7/2/2014. Currently taking cephalexin without issue.      Piroxicam     Plaquenil [Hydroxychloroquine Sulfate]     Prilosec Otc [Omeprazole Magnesium]     Procainamide Other (See Comments)    Procainamide Hcl      PROCAINAMIDE HCL powder    Seasonal Hives     Other reaction(s): Unknown  LoopHzcal, Sifmapu    Seldane [Terfenadine]     Sulfa Antibiotics      Other reaction(s): Dizziness    Tramadol Other (See Comments)     Dizziness    Omeprazole Other (See Comments) and Nausea And Vomiting       Problem List:    Patient Active Problem List   Diagnosis Code    Nonischemic cardiomyopathy ([facility] [address] I42.8    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    Fibrositis M79.7    Muscle ache M79.10    Osteomyelitis (HCC) M86.9    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    Abnormal presence of protein in urine R80.9    Disseminated lupus erythematosus ([facility]) M32.9    Fibromyalgia M79.7    AICD (automatic cardioverter/defibrillator) present Z95.810    Paroxysmal atrial fibrillation (HCC) I48.0    Bilateral enlargement of atria I51.7    Non-rheumatic tricuspid valve insufficiency I36.1    Non-rheumatic mitral regurgitation I34.0    Chronic systolic congestive heart failure (HCC) I50.22    Bilateral leg edema R60.0  Long term current use of antiarrhythmic drug Z79.899    Orthostatic hypotension I95.1    Essential hypertension I10       Past Medical History:        Diagnosis Date    Atrial fibrillation (HCC)     Cancer (HCC)     non hodgkins lymphoma    Cardiomyopathy ([address]     Cataract     CHF (congestive heart failure) (HCC)     COPD (chronic obstructive pulmonary disease) (HCC)     Dyslipidemia     Emphysema of lung (HCC)     Esophageal reflux     Fibromyalgia     Lupus ([address]     Macular degeneration     Osteoarthritis     Osteopenia     Osteopenia     Systemic lupus erythematosus ([address]     Trigeminal neuralgia        Past Surgical History:        Procedure Laterality Date    CARDIAC DEFIBRILLATOR PLACEMENT  11/12, 4/3/2018    [employer]    CHOLECYSTECTOMY      COLONOSCOPY      EYE SURGERY      cataracts    [facility]    SHOULDER ARTHROPLASTY  2013    UPPER GASTROINTESTINAL ENDOSCOPY  9/21/2020    EGD BIOPSY performed by Cesar Amador MD at [place] [address] History:    Social History     Tobacco Use    Smoking status: Never Smoker    Smokeless tobacco: Never Used   Substance Use Topics    Alcohol use:  No                                Counseling given: Not Answered      Vital Signs (Current):   Vitals:    12/18/20 1405 12/18/20 1413   BP: 129/62    Pulse: 69    Resp: 20    Temp: 96.7 °F (35.9 °C)    TempSrc: Temporal    SpO2: 100%    Weight:  103 lb (46.7 kg)   Height:  5' (1.524 m)                                              BP Readings from Last 3 Encounters:   12/18/20 129/62   09/21/20 (!) 121/49   09/21/20 (!) 130/59       NPO Status: Time of last liquid consumption: 1130                        Time of last solid consumption: 1800                        Date of last liquid consumption: 12/18/20                        Date of last solid food consumption: 12/16/20    BMI:   Wt Readings from Last 3 Encounters:   12/18/20 103 lb (46.7 kg) 09/21/20 100 lb (45.4 kg)   06/22/20 100 lb (45.4 kg)     Body mass index is 20.12 kg/m². CBC: No results found for: WBC, RBC, HGB, HCT, MCV, RDW, PLT    CMP:   Lab Results   Component Value Date     03/17/2020    K 3.8 03/17/2020    CL 95 03/17/2020    CO2 35 03/17/2020    BUN 29 03/17/2020    CREATININE 0.79 03/17/2020    GLUCOSE 89 03/17/2020    CALCIUM 9.5 03/17/2020       POC Tests: No results for input(s): POCGLU, POCNA, POCK, POCCL, POCBUN, POCHEMO, POCHCT in the last 72 hours. Coags:   Lab Results   Component Value Date    PROTIME 10.5 12/10/2012    INR 0.9 12/10/2012       HCG (If Applicable): No results found for: PREGTESTUR, PREGSERUM, HCG, HCGQUANT     ABGs: No results found for: PHART, PO2ART, BXD9FPU, IBZ1QHH, BEART, N4WSRPUZ     Type & Screen (If Applicable):  No results found for: LABABO, LABRH    Drug/Infectious Status (If Applicable):  No results found for: HIV, HEPCAB    COVID-19 Screening (If Applicable):   Lab Results   Component Value Date    COVID19 Not Detected 12/14/2020    COVID19 Not Detected 09/17/2020         Anesthesia Evaluation    Airway: Mallampati: I  TM distance: >3 FB   Neck ROM: full  Mouth opening: > = 3 FB Dental:          Pulmonary:                              Cardiovascular:    (+) hypertension:, pacemaker: AICD, dysrhythmias: atrial fibrillation,     (-)  angina                Neuro/Psych:               GI/Hepatic/Renal:             Endo/Other:                     Abdominal:           Vascular:                                        Anesthesia Plan      MAC     ASA 4             Anesthetic plan and risks discussed with patient.                       Brittani Peace MD   12/18/2020

## 2020-12-18 NOTE — ANESTHESIA POSTPROCEDURE EVALUATION
Department of Anesthesiology  Postprocedure Note    Patient: Raoul Reid  MRN: [id number]  YOB: 1935  Date of evaluation: 12/18/2020  Time:  6:22 PM     Procedure Summary     Date: 12/18/20 Room / Location: [place] [address] / [facility] - INPATIENT    Anesthesia Start: 5741 Anesthesia Stop:     Procedure: COLONOSCOPY POLYPECTOMY SNARE/COLD BIOPSY (N/A ) Diagnosis: (DX ANEMIA)    Surgeons: Ebb Cockayne, MD Responsible Provider: Yenifer Brannon MD    Anesthesia Type: MAC ASA Status: 4          Anesthesia Type: No value filed. Javi Phase I:      Javi Phase II: Javi Score: 10    Last vitals: Reviewed and per EMR flowsheets.        Anesthesia Post Evaluation    Complications: no

## 2020-12-22 LAB — SURGICAL PATHOLOGY REPORT: NORMAL

## 2021-09-21 ENCOUNTER — TELEPHONE (OUTPATIENT)
Dept: INTERVENTIONAL RADIOLOGY/VASCULAR | Age: 86
End: 2021-09-21

## 2021-09-27 ENCOUNTER — TELEPHONE (OUTPATIENT)
Dept: INTERVENTIONAL RADIOLOGY/VASCULAR | Age: 86
End: 2021-09-27

## 2021-10-21 ENCOUNTER — HOSPITAL ENCOUNTER (OUTPATIENT)
Age: 86
Discharge: HOME | End: 2021-10-21
Payer: MEDICARE

## 2021-10-21 ENCOUNTER — HOSPITAL ENCOUNTER (OUTPATIENT)
Dept: CT IMAGING | Age: 86
Discharge: HOME | End: 2021-10-23
Payer: MEDICARE

## 2021-10-21 VITALS
OXYGEN SATURATION: 95 % | TEMPERATURE: 97.7 F | HEART RATE: 60 BPM | HEIGHT: 60 IN | SYSTOLIC BLOOD PRESSURE: 121 MMHG | WEIGHT: 105.6 LBS | BODY MASS INDEX: 20.73 KG/M2 | DIASTOLIC BLOOD PRESSURE: 59 MMHG | RESPIRATION RATE: 13 BRPM

## 2021-10-21 DIAGNOSIS — D59.0 DRUG-INDUCED AUTOANTIBODY TYPE HEMOLYTIC ANEMIA (HCC): ICD-10-CM

## 2021-10-21 LAB
-: NORMAL
-: NORMAL
ABSOLUTE EOS #: 0.04 K/UL (ref 0–0.4)
ABSOLUTE EOS #: 0.04 K/UL (ref 0–0.4)
ABSOLUTE IMMATURE GRANULOCYTE: 0.04 K/UL (ref 0–0.3)
ABSOLUTE IMMATURE GRANULOCYTE: 0.04 K/UL (ref 0–0.3)
ABSOLUTE LYMPH #: 0.68 K/UL (ref 1–4.8)
ABSOLUTE LYMPH #: 0.68 K/UL (ref 1–4.8)
ABSOLUTE MONO #: 0.68 K/UL (ref 0.2–0.8)
ABSOLUTE MONO #: 0.68 K/UL (ref 0.2–0.8)
ABSOLUTE RETIC #: 0.08 M/UL (ref 0.03–0.08)
BASOPHILS # BLD: 0 %
BASOPHILS # BLD: 0 %
BASOPHILS ABSOLUTE: 0 K/UL (ref 0–0.2)
BASOPHILS ABSOLUTE: 0 K/UL (ref 0–0.2)
DIFFERENTIAL TYPE: ABNORMAL
DIFFERENTIAL TYPE: ABNORMAL
EOSINOPHILS RELATIVE PERCENT: 1 % (ref 1–4)
EOSINOPHILS RELATIVE PERCENT: 1 % (ref 1–4)
FERRITIN: 248 UG/L (ref 13–150)
FOLATE: >20 NG/ML
HCT VFR BLD CALC: 28.7 % (ref 36.3–47.1)
HCT VFR BLD CALC: 28.7 % (ref 36.3–47.1)
HEMOGLOBIN: 9 G/DL (ref 11.9–15.1)
HEMOGLOBIN: 9 G/DL (ref 11.9–15.1)
IMMATURE GRANULOCYTES: 1 %
IMMATURE GRANULOCYTES: 1 %
IMMATURE RETIC FRACT: 13.5 % (ref 2.7–18.3)
INR BLD: 1
IRON SATURATION: 34 % (ref 20–55)
IRON: 66 UG/DL (ref 37–145)
LYMPHOCYTES # BLD: 19 % (ref 24–44)
LYMPHOCYTES # BLD: 19 % (ref 24–44)
MCH RBC QN AUTO: 34.4 PG (ref 25.2–33.5)
MCH RBC QN AUTO: 34.4 PG (ref 25.2–33.5)
MCHC RBC AUTO-ENTMCNC: 31.4 G/DL (ref 28.4–34.8)
MCHC RBC AUTO-ENTMCNC: 31.4 G/DL (ref 28.4–34.8)
MCV RBC AUTO: 109.5 FL (ref 82.6–102.9)
MCV RBC AUTO: 109.5 FL (ref 82.6–102.9)
MONOCYTES # BLD: 19 % (ref 1–7)
MONOCYTES # BLD: 19 % (ref 1–7)
MORPHOLOGY: ABNORMAL
MORPHOLOGY: ABNORMAL
NRBC AUTOMATED: ABNORMAL PER 100 WBC
NRBC AUTOMATED: ABNORMAL PER 100 WBC
PARTIAL THROMBOPLASTIN TIME: 24.2 SEC (ref 23.9–33.8)
PDW BLD-RTO: 15.4 % (ref 11.8–14.4)
PDW BLD-RTO: 15.4 % (ref 11.8–14.4)
PLATELET # BLD: 100 K/UL (ref 138–453)
PLATELET # BLD: 100 K/UL (ref 138–453)
PLATELET ESTIMATE: ABNORMAL
PLATELET ESTIMATE: ABNORMAL
PMV BLD AUTO: 12.2 FL (ref 8.1–13.5)
PMV BLD AUTO: 12.2 FL (ref 8.1–13.5)
PROTHROMBIN TIME: 13.3 SEC (ref 11.5–14.2)
RBC # BLD: 2.62 M/UL (ref 3.95–5.11)
RBC # BLD: 2.62 M/UL (ref 3.95–5.11)
RBC # BLD: ABNORMAL 10*6/UL
RBC # BLD: ABNORMAL 10*6/UL
REASON FOR REJECTION: NORMAL
REASON FOR REJECTION: NORMAL
RETIC %: 2.9 % (ref 0.5–1.9)
RETIC HEMOGLOBIN: 33.7 PG (ref 28.2–35.7)
SEG NEUTROPHILS: 60 % (ref 36–66)
SEG NEUTROPHILS: 60 % (ref 36–66)
SEGMENTED NEUTROPHILS ABSOLUTE COUNT: 2.16 K/UL (ref 1.8–7.7)
SEGMENTED NEUTROPHILS ABSOLUTE COUNT: 2.16 K/UL (ref 1.8–7.7)
TOTAL IRON BINDING CAPACITY: 194 UG/DL (ref 250–450)
UNSATURATED IRON BINDING CAPACITY: 128 UG/DL (ref 112–347)
VITAMIN B-12: 523 PG/ML (ref 232–1245)
WBC # BLD: 3.6 K/UL (ref 3.5–11.3)
WBC # BLD: 3.6 K/UL (ref 3.5–11.3)
WBC # BLD: ABNORMAL 10*3/UL
WBC # BLD: ABNORMAL 10*3/UL
ZZ NTE CLEAN UP: ORDERED TEST: NORMAL
ZZ NTE CLEAN UP: ORDERED TEST: NORMAL
ZZ NTE WITH NAME CLEAN UP: SPECIMEN SOURCE: NORMAL
ZZ NTE WITH NAME CLEAN UP: SPECIMEN SOURCE: NORMAL

## 2021-10-21 PROCEDURE — 82746 ASSAY OF FOLIC ACID SERUM: CPT

## 2021-10-21 PROCEDURE — 88271 CYTOGENETICS DNA PROBE: CPT

## 2021-10-21 PROCEDURE — 83550 IRON BINDING TEST: CPT

## 2021-10-21 PROCEDURE — 88305 TISSUE EXAM BY PATHOLOGIST: CPT

## 2021-10-21 PROCEDURE — 88275 CYTOGENETICS 100-300: CPT

## 2021-10-21 PROCEDURE — 82728 ASSAY OF FERRITIN: CPT

## 2021-10-21 PROCEDURE — 88184 FLOWCYTOMETRY/ TC 1 MARKER: CPT

## 2021-10-21 PROCEDURE — 85045 AUTOMATED RETICULOCYTE COUNT: CPT

## 2021-10-21 PROCEDURE — 88264 CHROMOSOME ANALYSIS 20-25: CPT

## 2021-10-21 PROCEDURE — 88185 FLOWCYTOMETRY/TC ADD-ON: CPT

## 2021-10-21 PROCEDURE — 85025 COMPLETE CBC W/AUTO DIFF WBC: CPT

## 2021-10-21 PROCEDURE — 77012 CT SCAN FOR NEEDLE BIOPSY: CPT

## 2021-10-21 PROCEDURE — 6360000002 HC RX W HCPCS: Performed by: RADIOLOGY

## 2021-10-21 PROCEDURE — 88341 IMHCHEM/IMCYTCHM EA ADD ANTB: CPT

## 2021-10-21 PROCEDURE — 2580000003 HC RX 258: Performed by: RADIOLOGY

## 2021-10-21 PROCEDURE — 7100000010 HC PHASE II RECOVERY - FIRST 15 MIN

## 2021-10-21 PROCEDURE — 82607 VITAMIN B-12: CPT

## 2021-10-21 PROCEDURE — 85610 PROTHROMBIN TIME: CPT

## 2021-10-21 PROCEDURE — 88237 TISSUE CULTURE BONE MARROW: CPT

## 2021-10-21 PROCEDURE — 83540 ASSAY OF IRON: CPT

## 2021-10-21 PROCEDURE — 85730 THROMBOPLASTIN TIME PARTIAL: CPT

## 2021-10-21 PROCEDURE — 88311 DECALCIFY TISSUE: CPT

## 2021-10-21 PROCEDURE — 2709999900 CT BIOPSY BONE MARROW

## 2021-10-21 PROCEDURE — 88280 CHROMOSOME KARYOTYPE STUDY: CPT

## 2021-10-21 PROCEDURE — 88313 SPECIAL STAINS GROUP 2: CPT

## 2021-10-21 PROCEDURE — 7100000011 HC PHASE II RECOVERY - ADDTL 15 MIN

## 2021-10-21 PROCEDURE — 88342 IMHCHEM/IMCYTCHM 1ST ANTB: CPT

## 2021-10-21 RX ORDER — LINACLOTIDE 72 UG/1
CAPSULE, GELATIN COATED ORAL EVERY OTHER DAY
COMMUNITY
Start: 2021-07-30

## 2021-10-21 RX ORDER — PREDNISONE 2.5 MG
TABLET ORAL DAILY
COMMUNITY
Start: 2021-07-15

## 2021-10-21 RX ORDER — FAMOTIDINE 40 MG/1
TABLET, FILM COATED ORAL
COMMUNITY
Start: 2021-09-17

## 2021-10-21 RX ORDER — SODIUM CHLORIDE 9 MG/ML
25 INJECTION, SOLUTION INTRAVENOUS PRN
Status: DISCONTINUED | OUTPATIENT
Start: 2021-10-21 | End: 2021-10-24 | Stop reason: HOSPADM

## 2021-10-21 RX ORDER — ACETAMINOPHEN 325 MG/1
650 TABLET ORAL EVERY 4 HOURS PRN
Status: DISCONTINUED | OUTPATIENT
Start: 2021-10-21 | End: 2021-10-24 | Stop reason: HOSPADM

## 2021-10-21 RX ORDER — SODIUM CHLORIDE 0.9 % (FLUSH) 0.9 %
5-40 SYRINGE (ML) INJECTION PRN
Status: DISCONTINUED | OUTPATIENT
Start: 2021-10-21 | End: 2021-10-24 | Stop reason: HOSPADM

## 2021-10-21 RX ORDER — FENTANYL CITRATE 50 UG/ML
INJECTION, SOLUTION INTRAMUSCULAR; INTRAVENOUS
Status: COMPLETED | OUTPATIENT
Start: 2021-10-21 | End: 2021-10-21

## 2021-10-21 RX ADMIN — SODIUM CHLORIDE 25 ML: 9 INJECTION, SOLUTION INTRAVENOUS at 09:27

## 2021-10-21 RX ADMIN — FENTANYL CITRATE 25 MCG: 50 INJECTION INTRAMUSCULAR; INTRAVENOUS at 11:12

## 2021-10-21 ASSESSMENT — PAIN DESCRIPTION - PAIN TYPE
TYPE: SURGICAL PAIN

## 2021-10-21 ASSESSMENT — PAIN - FUNCTIONAL ASSESSMENT: PAIN_FUNCTIONAL_ASSESSMENT: 0-10

## 2021-10-21 ASSESSMENT — PAIN DESCRIPTION - DESCRIPTORS: DESCRIPTORS: ACHING;CONSTANT

## 2021-10-21 ASSESSMENT — PAIN SCALES - GENERAL
PAINLEVEL_OUTOF10: 0

## 2021-10-21 NOTE — H&P
Interval H&P Note    Pt Name: Leyda Lujan  MRN: [id number]  YOB: 1935  Date of evaluation: 10/21/2021      [x] I have reviewed the hardcopy Oncology Progress Note by Dr Maryann Bryan dated 10/8/21 labeled in short chart for an Interval History and Physical note. 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 The patient denies new health changes, fever, chills, wheezing, cough, increased SOB, chest pain, open sores or wounds. Past Medical History:     Past Medical History:   Diagnosis Date    Atrial fibrillation ([address]     Cancer (HCC)     non hodgkins lymphoma    Cardiomyopathy ([address]     Cataract     CHF (congestive heart failure) (HCC)     COPD (chronic obstructive pulmonary disease) (HCC)     Dyslipidemia     Emphysema of lung (HCC)     Esophageal reflux     Fibromyalgia     Lupus ([address]     Macular degeneration     Osteoarthritis     Osteopenia     Osteopenia     Systemic lupus erythematosus ([address]     Trigeminal neuralgia         Past Surgical History:     Past Surgical History:   Procedure Laterality Date    CARDIAC DEFIBRILLATOR PLACEMENT  11/12, 4/3/2018    [employer]    CHOLECYSTECTOMY      COLONOSCOPY      COLONOSCOPY  12/18/2020    COLONOSCOPY N/A 12/18/2020    COLONOSCOPY POLYPECTOMY SNARE/COLD BIOPSY performed by Ari Spence MD at [address] [place] [address] St KYPHOPLASTY  08/2020    [place] [address]    UPPER GASTROINTESTINAL ENDOSCOPY  9/21/2020    EGD BIOPSY performed by Ari Spence MD at [address] [facility]        Medications Prior to Admission:     Prior to Admission medications    Medication Sig Start Date End Date Taking?  Authorizing Provider   dronedarone hcl (MULTAQ) 400 MG TABS Take 400 mg by mouth 2 times daily (with meals) 5/14/21  Yes Historical Provider, MD   famotidine (PEPCID) 40 MG tablet  9/17/21  Yes Historical Provider, MD   Marlee Clarkson 72 MCG CAPS capsule every other day  7/30/21  Yes Historical Provider, MD   predniSONE (DELTASONE) 2.5 MG tablet daily  7/15/21  Yes Historical Provider, MD   furosemide (LASIX) 40 MG tablet 2 tablets in qam and 1 tablet afternoon 7/2/20  Yes Brad Lam MD   Probiotic Product (PROBIOTIC DAILY PO) Take by mouth   Yes Historical Provider, MD   Lutein 6 MG CAPS Take by mouth   Yes Historical Provider, MD   folic acid (FOLVITE) 1 MG tablet Take 1 mg by mouth daily. Yes Historical Provider, MD   Calcium Carbonate (CALCIUM 600 PO) Take  by mouth daily. Yes Historical Provider, MD   doxepin (SINEQUAN) 50 MG capsule Take 50 mg by mouth nightly. Yes Historical Provider, MD   Omega-3 Fatty Acids (OMEGA 3 PO) Take  by mouth daily. Yes Historical Provider, MD   Ascorbic Acid (VITAMIN C) 100 MG tablet Take 100 mg by mouth daily. Yes Historical Provider, MD        Allergies:     Amiodarone, Apresoline [hydralazine], Cephalosporins, Feldene [piroxicam], Fosamax [alendronate], Hydroxychloroquine, Infliximab, Levofloxacin, Loop diuretics, Niacin and related, Noroxin [norfloxacin], Pcn [penicillins], Penicillin v potassium, Piroxicam, Plaquenil [hydroxychloroquine sulfate], Prilosec otc [omeprazole magnesium], Procainamide, Procainamide hcl, Seasonal, Seldane [terfenadine], Sulfa antibiotics, Tramadol, and Omeprazole    Social History:     Tobacco:    reports that she has never smoked. She has never used smokeless tobacco.  Alcohol:      reports no history of alcohol use. Drug Use:  reports no history of drug use.     Family History:     Family History   Problem Relation Age of Onset    Heart Disease Father     Cancer Maternal Grandmother         uterine       Vital signs: /70   Pulse 62   Temp 97.5 °F (36.4 °C) (Temporal)   Resp 16   Ht 5' (1.524 m)   Wt 105 lb 9.6 oz (47.9 kg)   SpO2 99%   BMI 20.62 kg/m²     Allergies:  Amiodarone, Apresoline [hydralazine], Cephalosporins, Feldene [piroxicam], Fosamax [alendronate], Hydroxychloroquine, Infliximab, Levofloxacin, Loop diuretics, Niacin and related, Noroxin [norfloxacin], Pcn [penicillins], Penicillin v potassium, Piroxicam, Plaquenil [hydroxychloroquine sulfate], Prilosec otc [omeprazole magnesium], Procainamide, Procainamide hcl, Seasonal, Seldane [terfenadine], Sulfa antibiotics, Tramadol, and Omeprazole      This is a 82 y.o. female who is pleasant, cooperative, alert and oriented x3, in no acute distress    Physical Exam:  Lungs: Bilateral equal air entry, unlabored, clear to ausculation, no wheezing, rales or rhonchi, normal effort  Cardiovascular: ICD left upper chest HR 62 normal rate, regular rhythm at present, no murmur, gallop, rub. Abdomen: Soft, nontender, nondistended, normal bowel sounds. Labs:  No results for input(s): HGB, HCT, WBC, MCV, PLT, NA, K, CL, CO2, BUN, CREATININE, GLUCOSE, INR, PROTIME, APTT, AST, ALT, LABALBU, HCG in the last 720 hours. No results for input(s): COVID19 in the last 720 hours.     PATRICK Martinez CNP   Electronically signed 10/21/2021 at 9:21 AM

## 2021-10-25 LAB — BONE MARROW REPORT: NORMAL

## 2021-10-27 LAB — FLOW CYTOMETRY, BM: NORMAL

## 2021-11-01 LAB — CHROMOSOME STUDY: NORMAL

## 2022-01-04 ENCOUNTER — HOSPITAL ENCOUNTER (OUTPATIENT)
Age: 87
Setting detail: SPECIMEN
Discharge: HOME | End: 2022-01-04
Payer: MEDICARE

## 2022-01-04 LAB
ABSOLUTE EOS #: 0.07 K/UL (ref 0–0.44)
ABSOLUTE IMMATURE GRANULOCYTE: 0.07 K/UL (ref 0–0.3)
ABSOLUTE LYMPH #: 0.8 K/UL (ref 1.1–3.7)
ABSOLUTE MONO #: 1.1 K/UL (ref 0.1–1.2)
BASOPHILS # BLD: 0 % (ref 0–2)
BASOPHILS ABSOLUTE: 0 K/UL (ref 0–0.2)
CREAT SERPL-MCNC: 0.52 MG/DL (ref 0.5–0.9)
DIFFERENTIAL TYPE: ABNORMAL
EOSINOPHILS RELATIVE PERCENT: 1 % (ref 1–4)
FOLATE: >20 NG/ML
GFR AFRICAN AMERICAN: >60 ML/MIN
GFR NON-AFRICAN AMERICAN: >60 ML/MIN
GFR SERPL CREATININE-BSD FRML MDRD: NORMAL ML/MIN/{1.73_M2}
GFR SERPL CREATININE-BSD FRML MDRD: NORMAL ML/MIN/{1.73_M2}
HCT VFR BLD CALC: 36.6 % (ref 36.3–47.1)
HEMOGLOBIN: 11.5 G/DL (ref 11.9–15.1)
IMMATURE GRANULOCYTES: 1 %
IRON: 49 UG/DL (ref 37–145)
LYMPHOCYTES # BLD: 11 % (ref 24–43)
MCH RBC QN AUTO: 33.4 PG (ref 25.2–33.5)
MCHC RBC AUTO-ENTMCNC: 31.4 G/DL (ref 28.4–34.8)
MCV RBC AUTO: 106.4 FL (ref 82.6–102.9)
MONOCYTES # BLD: 15 % (ref 3–12)
MORPHOLOGY: ABNORMAL
MORPHOLOGY: ABNORMAL
NRBC AUTOMATED: 0 PER 100 WBC
PDW BLD-RTO: 15.3 % (ref 11.8–14.4)
PLATELET # BLD: 217 K/UL (ref 138–453)
PLATELET ESTIMATE: ABNORMAL
PMV BLD AUTO: 12.6 FL (ref 8.1–13.5)
RBC # BLD: 3.44 M/UL (ref 3.95–5.11)
RBC # BLD: ABNORMAL 10*6/UL
SEG NEUTROPHILS: 72 % (ref 36–65)
SEGMENTED NEUTROPHILS ABSOLUTE COUNT: 5.26 K/UL (ref 1.5–8.1)
VITAMIN B-12: 563 PG/ML (ref 232–1245)
WBC # BLD: 7.3 K/UL (ref 3.5–11.3)
WBC # BLD: ABNORMAL 10*3/UL

## 2022-01-04 PROCEDURE — 83540 ASSAY OF IRON: CPT

## 2022-01-04 PROCEDURE — 85025 COMPLETE CBC W/AUTO DIFF WBC: CPT

## 2022-01-04 PROCEDURE — 82728 ASSAY OF FERRITIN: CPT

## 2022-01-04 PROCEDURE — 36415 COLL VENOUS BLD VENIPUNCTURE: CPT

## 2022-01-04 PROCEDURE — 82746 ASSAY OF FOLIC ACID SERUM: CPT

## 2022-01-04 PROCEDURE — 82565 ASSAY OF CREATININE: CPT

## 2022-01-04 PROCEDURE — P9603 ONE-WAY ALLOW PRORATED MILES: HCPCS

## 2022-01-04 PROCEDURE — 82607 VITAMIN B-12: CPT

## 2022-01-05 LAB — FERRITIN: 149 UG/L (ref 13–150)

## 2022-09-06 ENCOUNTER — HOSPITAL ENCOUNTER (OUTPATIENT)
Dept: GENERAL RADIOLOGY | Facility: CLINIC | Age: 87
Discharge: HOME | End: 2022-09-08
Payer: MEDICARE

## 2022-09-06 DIAGNOSIS — J69.0 ASPIRATION PNEUMONIA, UNSPECIFIED ASPIRATION PNEUMONIA TYPE, UNSPECIFIED LATERALITY, UNSPECIFIED PART OF LUNG (HCC): ICD-10-CM

## 2022-09-06 PROCEDURE — 71046 X-RAY EXAM CHEST 2 VIEWS: CPT

## 2024-06-26 LAB
ALBUMIN: 3.5 G/DL
ALK PHOSPHATASE: 103 U/L
ALT SERPL-CCNC: 23 U/L
AST SERPL-CCNC: 41 U/L
BASOPHILS RELATIVE PERCENT: 0.03 X10^3UL
BILIRUB SERPL-MCNC: 0.8 MG/DL
BUN BLDV-MCNC: 35 MG/DL
CALCIUM SERPL-MCNC: 8.6 MG/DL
CHLORIDE BLD-SCNC: 103 MMOL/L
CO2: 29 MMOL/L
CREAT SERPL-MCNC: 0.8 MG/DL
EOSINOPHIL # BLD: 0.03 X10^3UL
ERYTHROCYTE [DISTWIDTH] IN BLOOD BY AUTOMATED COUNT: 60.2 FL
GFR AFRICAN AMERICAN: 82.1 ML/M1.7
GFR NON-AFRICAN AMERICAN: 67.9 ML/M1.7
GLUCOSE: 133 MG/DL
HCT VFR BLD CALC: 29.3 %
HEMOGLOBIN: 10.5 G/DL
IMMATURE GRANULOCYTES %: 0.05 X10^3UL
LACTATE DEHYDROGENASE: 194 U/L
LYMPHOCYTES # BLD: 1.13 X10^3UL
MCH RBC QN AUTO: 41 PG
MCHC RBC AUTO-ENTMCNC: 35.8 G/DL
MCV RBC AUTO: 114.5 FL
MONOCYTES RELATIVE PERCENT: 0.95 X10^3UL
NEUTROPHILS: 1.91 X10^3UL
NUCLEATED RED BLOOD CELLS: 0
PLATELET # BLD: 146 X10^3UL
POTASSIUM SERPL-SCNC: 4.1 MMOL/L
RBC # BLD: 2.56 X10^6UL
SODIUM BLD-SCNC: 139 MMOL/L
TOTAL PROTEIN: 7.2 G/DL
WBC # BLD: 4.1 X10^3UL

## 2024-07-02 LAB
A/G RATIO: 1.3
ALBUMIN: 3.9 G/DL
ALPHA-1-GLOBULIN: 0.2 G/DL
ALPHA-2-GLOBULIN: 0.6 G/DL
BETA GLOBULIN: 0.5 G/DL
GAMMA GLOBULIN: 1.8 G/DL
GLOBULIN: 3.1 G/DL
IGA: 256 MG/DL
IGG: 1753 MG/DL
IGM: 541 MG/DL
IMMUNOFIXATION RESULT, SERUM: ABNORMAL
M SPIKE 1 SERUM PROTEIN ELEC: 0.3 G/DL
TOTAL PROTEIN: 7 G/DL

## (undated) DEVICE — ELECTRODE PT RET AD L9FT HI MOIST COND ADH HYDRGEL CORDED

## (undated) DEVICE — TRAP SURG QUAD PARABOLA SLOT DSGN SFTY SCRN TRAPEASE

## (undated) DEVICE — CO2 CANNULA,SUPERSOFT, ADLT,7'O2,7'CO2: Brand: MEDLINE

## (undated) DEVICE — YANKAUER,FLEXIBLE HANDLE,REGLR CAPACITY: Brand: MEDLINE INDUSTRIES, INC.

## (undated) DEVICE — Device: Brand: DEFENDO VALVE AND CONNECTOR KIT

## (undated) DEVICE — JELLY,LUBE,STERILE,FLIP TOP,TUBE,2-OZ: Brand: MEDLINE

## (undated) DEVICE — MEDICINE CUP, GRADUATED, STER: Brand: MEDLINE

## (undated) DEVICE — TUBING, SUCTION, 1/4" X 12', STRAIGHT: Brand: MEDLINE

## (undated) DEVICE — BASIN EMSIS 700ML GRAPHITE PLAS KID SHP GRAD

## (undated) DEVICE — FORCEP BX MESH TOOTH MIC 2.8 MMX240 CM NDL STRL RADIAL JAW 4

## (undated) DEVICE — FORCEPS BX L240CM JAW DIA2.2MM RAD JAW 4 HOT DISP

## (undated) DEVICE — GOWN,AURORA,NONREINFORCED,LARGE: Brand: MEDLINE

## (undated) DEVICE — ADAPTER TBNG LUER STUB 15 GA INTMED

## (undated) DEVICE — SYRINGE MED 50ML LUERLOCK TIP

## (undated) DEVICE — CUP MED 1OZ CLR POLYPR FEED GRAD W/O LID

## (undated) DEVICE — GAUZE,SPONGE,4"X4",16PLY,STRL,LF,10/TRAY: Brand: MEDLINE